# Patient Record
Sex: FEMALE | Race: WHITE
[De-identification: names, ages, dates, MRNs, and addresses within clinical notes are randomized per-mention and may not be internally consistent; named-entity substitution may affect disease eponyms.]

---

## 2018-01-10 ENCOUNTER — HOSPITAL ENCOUNTER (EMERGENCY)
Dept: HOSPITAL 59 - ER | Age: 63
Discharge: HOME | End: 2018-01-10
Payer: MEDICARE

## 2018-01-10 DIAGNOSIS — S16.1XXA: Primary | ICD-10-CM

## 2018-01-10 DIAGNOSIS — S20.211A: ICD-10-CM

## 2018-01-10 DIAGNOSIS — Z87.891: ICD-10-CM

## 2018-01-10 DIAGNOSIS — Z79.01: ICD-10-CM
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
## 2018-01-10 DIAGNOSIS — I10: ICD-10-CM

## 2018-01-10 DIAGNOSIS — S00.03XA: ICD-10-CM

## 2018-01-10 LAB
ANION GAP SERPL CALC-SCNC: 14 MMOL/L (ref 7–16)
APAP SERPL-MCNC: < 5 UG/ML (ref 10–30)
BASOPHILS NFR BLD: 0.5 % (ref 0–6)
BUN SERPL-MCNC: 6 MG/DL (ref 8–23)
CO2 SERPL-SCNC: 28 MMOL/L (ref 22–29)
CREAT SERPL-MCNC: 0.8 MG/DL (ref 0.5–0.9)
EOSINOPHIL NFR BLD: 3.5 % (ref 0–6)
ERYTHROCYTE [DISTWIDTH] IN BLOOD BY AUTOMATED COUNT: 14.2 % (ref 11.5–14.5)
EST GLOMERULAR FILTRATION RATE: > 60 ML/MIN
ETHANOL SERPL-MCNC: 0 G/DL (ref 0–0.01)
GLUCOSE SERPL-MCNC: 100 MG/DL (ref 74–109)
GRANULOCYTES NFR BLD: 55.5 % (ref 47–80)
HCT VFR BLD CALC: 39.5 % (ref 35–47)
HGB BLD-MCNC: 12.8 GM/DL (ref 11.6–16)
INR PPP: 4.23
LYMPHOCYTES NFR BLD AUTO: 29 % (ref 16–45)
MCH RBC QN AUTO: 29.1 PG (ref 27–33)
MCHC RBC AUTO-ENTMCNC: 32.4 G/DL (ref 32–36)
MCV RBC AUTO: 89.8 FL (ref 81–97)
MONOCYTES NFR BLD: 11.5 % (ref 0–9)
PLATELET # BLD: 384 K/UL (ref 130–400)
PMV BLD AUTO: 8.5 FL (ref 7.4–10.4)
PROTHROMBIN TIME: 46.3 SECONDS (ref 9.5–12.1)
RBC # BLD AUTO: 4.4 M/UL (ref 3.8–5.4)
WBC # BLD AUTO: 5.7 K/UL (ref 4.2–12.2)

## 2018-01-10 PROCEDURE — 80320 DRUG SCREEN QUANTALCOHOLS: CPT

## 2018-01-10 PROCEDURE — 99284 EMERGENCY DEPT VISIT MOD MDM: CPT

## 2018-01-10 PROCEDURE — 80048 BASIC METABOLIC PNL TOTAL CA: CPT

## 2018-01-10 PROCEDURE — 70450 CT HEAD/BRAIN W/O DYE: CPT

## 2018-01-10 PROCEDURE — 71046 X-RAY EXAM CHEST 2 VIEWS: CPT

## 2018-01-10 PROCEDURE — 85025 COMPLETE CBC W/AUTO DIFF WBC: CPT

## 2018-01-10 PROCEDURE — 99283 EMERGENCY DEPT VISIT LOW MDM: CPT

## 2018-01-10 PROCEDURE — 72125 CT NECK SPINE W/O DYE: CPT

## 2018-01-10 PROCEDURE — 80329 ANALGESICS NON-OPIOID 1 OR 2: CPT

## 2018-01-10 PROCEDURE — 85610 PROTHROMBIN TIME: CPT

## 2018-01-10 NOTE — EMERGENCY DEPARTMENT RECORD
History of Present Illness





- General


Chief complaint: Head Injury


Stated complaint: HEAD WOUND


Time Seen by Provider: 01/10/18 17:22





- Related Data


 Home Medications











 Medication  Instructions  Recorded  Confirmed  Last Taken


 


Alprazolam 1 mg PO ASDIR 01/10/18 01/10/18 Unknown


 


Atorvastatin Calcium 40 mg PO DAILY 01/10/18 01/10/18 Unknown


 


Bisacodyl [Laxative] 5 mg PO QHS 01/10/18 01/10/18 Unknown


 


Carvedilol [Coreg] 3.125 mg PO BID 01/10/18 01/10/18 Unknown


 


Citalopram Hydrobromide [Celexa] 40 mg PO DAILY 01/10/18 01/10/18 Unknown


 


Hydrocodone/Acetaminophen 1 tab PO Q6H PRN 01/10/18 01/10/18 Unknown





[Hydrocodone/Acetaminophen    





7.5mg/325mg]    


 


Lisinopril 2.5 mg PO DAILY 01/10/18 01/10/18 Unknown


 


Methyl Salicylate/Menthol 2 each TP QHS 01/10/18 01/10/18 Unknown





[Salonpas Patch]    


 


Metoclopramide HCl 10 mg PO BID 01/10/18 01/10/18 Unknown


 


Multivitamin [Multi-Vitamin Daily] 1 each PO DAILY 01/10/18 01/10/18 Unknown


 


Nitroglycerin 0.4 mg SL ASDIR 01/10/18 01/10/18 Unknown


 


Orphenadrine Citrate [Norflex] 100 mg PO BID 01/10/18 01/10/18 Unknown


 


Triamterene/Hydrochlorothiazid 1 each PO DAILY 01/10/18 01/10/18 Unknown





[Triamterene-Hctz 37.5-25 mg Cp]    


 


Vitamin E 400 unit PO DAILY 01/10/18 01/10/18 Unknown


 


Warfarin Sodium 3 mg PO DAILY 01/10/18 01/10/18 Unknown


 


Zolpidem Tartrate 10 mg PO ASDIR 01/10/18 01/10/18 Unknown











Allergies/Adverse reactions: 


 Allergies











Allergy/AdvReac Type Severity Reaction Status Date / Time


 


amoxicillin Allergy  PT UNSURE Verified 01/10/18 17:39





   OF REACTION  


 


aspirin Allergy  PT UNSURE Verified 01/10/18 17:39





   OF REACTION  


 


azithromycin Allergy  PT UNSURE Verified 01/10/18 17:39





   OF REACTION  


 


Beta-Blockers Allergy  PT UNSURE Verified 01/10/18 17:39





(Beta-Adrenergic Bloc   OF REACTION  


 


bismuth subsalicylate Allergy  PT UNSURE Verified 01/10/18 17:39





   OF REACTION  


 


carvedilol Allergy  PT UNSURE Verified 01/10/18 17:39





   OF REACTION  


 


celecoxib Allergy  PT UNSURE Verified 01/10/18 17:39





   OF REACTION  


 


cholestyramine Allergy  PT UNSURE Verified 01/10/18 17:39





   OF REACTION  


 


clopidogrel Allergy  PT UNSURE Verified 01/10/18 17:39





   OF REACTION  


 


cyclobenzaprine Allergy  PT UNSURE Verified 01/10/18 17:39





   OF REACTION  


 


doxycycline Allergy  PT UNSURE Verified 01/10/18 17:39





   OF REACTION  


 


eletriptan Allergy  PT UNSURE Verified 01/10/18 17:39





   OF REACTION  


 


fluoxetine Allergy  PT UNSURE Verified 01/10/18 17:39





   OF REACTION  


 


flurbiprofen Allergy  PT UNSURE Verified 01/10/18 17:39





   OF REACTION  


 


ibuprofen Allergy  PT UNSURE Verified 01/10/18 17:39





   OF REACTION  


 


isosorbide Allergy  PT UNSURE Verified 01/10/18 17:39





   OF REACTION  


 


levofloxacin Allergy  PT UNSURE Verified 01/10/18 17:39





   OF REACTION  


 


lisinopril Allergy  PT UNSURE Verified 01/10/18 17:39





   OF REACTION  


 


metaxalone Allergy  PT UNSURE Verified 01/10/18 17:39





   OF REACTION  


 


montelukast Allergy  PT UNSURE Verified 01/10/18 17:39





   OF REACTION  


 


polyethylene glycol 3350 Allergy  PT UNSURE Verified 01/10/18 17:39





   OF REACTION  


 


prednisone Allergy  PT UNSURE Verified 01/10/18 17:39





   OF REACTION  


 


propranolol Allergy  PT UNSURE Verified 01/10/18 17:39





   OF REACTION  


 


sertraline Allergy  PT UNSURE Verified 01/10/18 17:39





   OF REACTION  


 


sucrose Allergy  PT UNSURE Verified 01/10/18 17:39





   OF REACTION  


 


topiramate Allergy  PT UNSURE Verified 01/10/18 17:39





   OF REACTION  














Medical Decision Making





- Lab Data


Result diagrams: 


 01/10/18 17:30





 01/10/18 17:30





 Lab Results











  01/10/18 Range/Units





  17:30 


 


WBC  5.7  (4.2-12.2)  K/uL


 


RBC  4.40  (3.80-5.40)  M/uL


 


Hgb  12.8  (11.6-16.0)  gm/dl


 


Hct  39.5  (35.0-47.0)  %


 


MCV  89.8  (81-97)  fl


 


MCH  29.1  (27-33)  pg


 


MCHC  32.4  (32-36)  g/dl


 


RDW  14.2  (11.5-14.5)  %


 


Plt Count  384  (130-400)  K/uL


 


MPV  8.5  (7.4-10.4)  fl


 


Gran %  55.5  (47-80)  %


 


Lymphocytes %  29.0  (16-45)  %


 


Monocytes %  11.5 H  (0-9)  %


 


Eosinophils %  3.5  (0-6)  %


 


Basophils %  0.5  (0-6)  %














Disposition


Clinical Impression: 


 Contusion of head, Cervical strain, Contusion, chest wall





Disposition: Home, Self-Care


Condition: (1) Good


Instructions:  Concussion (ED), Contusion in Adults (ED)


Additional Instructions: 


stop coumadin till monday


No aspirin


follow up with family  in one week


any symptoms return to ED





 


Forms:  Patient Portal Access





Quality





- Quality Measures


Quality Measures: N/A





- Blood Pressure Screening


Does Patient Have Any of the Following: No


Blood Pressure Classification: Pre-Hypertensive BP Reading


Systolic Measurement: 127


Diastolic Measurement: 72


Screening for High Blood Pressure: < Pre-Hypertensive BP, F/U Documented > [

]


Pre-Hypertensive Follow-up Interventions: Referral to alternative/primary care 

provider.

## 2018-01-10 NOTE — EMERGENCY DEPARTMENT RECORD
History of Present Illness





- General


Chief complaint: Head Injury


Stated complaint: HEAD WOUND


Time Seen by Provider: 01/10/18 17:22


Source: Patient





- History of Present Illness


Initial comments: 


patient slipped on a dog toy and hit the back of head and posterior right 

chest. alert NO LOC , no nausea or vomiting. ambulating without problems.








- Related Data


 Home Medications











 Medication  Instructions  Recorded  Confirmed  Last Taken


 


Alprazolam 1 mg PO ASDIR 01/10/18 01/10/18 Unknown


 


Atorvastatin Calcium 40 mg PO DAILY 01/10/18 01/10/18 Unknown


 


Bisacodyl [Laxative] 5 mg PO QHS 01/10/18 01/10/18 Unknown


 


Carvedilol [Coreg] 3.125 mg PO BID 01/10/18 01/10/18 Unknown


 


Citalopram Hydrobromide [Celexa] 40 mg PO DAILY 01/10/18 01/10/18 Unknown


 


Hydrocodone/Acetaminophen 1 tab PO Q6H PRN 01/10/18 01/10/18 Unknown





[Hydrocodone/Acetaminophen    





7.5mg/325mg]    


 


Lisinopril 2.5 mg PO DAILY 01/10/18 01/10/18 Unknown


 


Methyl Salicylate/Menthol 2 each TP QHS 01/10/18 01/10/18 Unknown





[Salonpas Patch]    


 


Metoclopramide HCl 10 mg PO BID 01/10/18 01/10/18 Unknown


 


Multivitamin [Multi-Vitamin Daily] 1 each PO DAILY 01/10/18 01/10/18 Unknown


 


Nitroglycerin 0.4 mg SL ASDIR 01/10/18 01/10/18 Unknown


 


Orphenadrine Citrate [Norflex] 100 mg PO BID 01/10/18 01/10/18 Unknown


 


Triamterene/Hydrochlorothiazid 1 each PO DAILY 01/10/18 01/10/18 Unknown





[Triamterene-Hctz 37.5-25 mg Cp]    


 


Vitamin E 400 unit PO DAILY 01/10/18 01/10/18 Unknown


 


Warfarin Sodium 3 mg PO DAILY 01/10/18 01/10/18 Unknown


 


Zolpidem Tartrate 10 mg PO ASDIR 01/10/18 01/10/18 Unknown











Allergies/Adverse reactions: 


 Allergies











Allergy/AdvReac Type Severity Reaction Status Date / Time


 


amoxicillin Allergy  PT UNSURE Verified 01/10/18 17:39





   OF REACTION  


 


aspirin Allergy  PT UNSURE Verified 01/10/18 17:39





   OF REACTION  


 


azithromycin Allergy  PT UNSURE Verified 01/10/18 17:39





   OF REACTION  


 


Beta-Blockers Allergy  PT UNSURE Verified 01/10/18 17:39





(Beta-Adrenergic Bloc   OF REACTION  


 


bismuth subsalicylate Allergy  PT UNSURE Verified 01/10/18 17:39





   OF REACTION  


 


carvedilol Allergy  PT UNSURE Verified 01/10/18 17:39





   OF REACTION  


 


celecoxib Allergy  PT UNSURE Verified 01/10/18 17:39





   OF REACTION  


 


cholestyramine Allergy  PT UNSURE Verified 01/10/18 17:39





   OF REACTION  


 


clopidogrel Allergy  PT UNSURE Verified 01/10/18 17:39





   OF REACTION  


 


cyclobenzaprine Allergy  PT UNSURE Verified 01/10/18 17:39





   OF REACTION  


 


doxycycline Allergy  PT UNSURE Verified 01/10/18 17:39





   OF REACTION  


 


eletriptan Allergy  PT UNSURE Verified 01/10/18 17:39





   OF REACTION  


 


fluoxetine Allergy  PT UNSURE Verified 01/10/18 17:39





   OF REACTION  


 


flurbiprofen Allergy  PT UNSURE Verified 01/10/18 17:39





   OF REACTION  


 


ibuprofen Allergy  PT UNSURE Verified 01/10/18 17:39





   OF REACTION  


 


isosorbide Allergy  PT UNSURE Verified 01/10/18 17:39





   OF REACTION  


 


levofloxacin Allergy  PT UNSURE Verified 01/10/18 17:39





   OF REACTION  


 


lisinopril Allergy  PT UNSURE Verified 01/10/18 17:39





   OF REACTION  


 


metaxalone Allergy  PT UNSURE Verified 01/10/18 17:39





   OF REACTION  


 


montelukast Allergy  PT UNSURE Verified 01/10/18 17:39





   OF REACTION  


 


polyethylene glycol 3350 Allergy  PT UNSURE Verified 01/10/18 17:39





   OF REACTION  


 


prednisone Allergy  PT UNSURE Verified 01/10/18 17:39





   OF REACTION  


 


propranolol Allergy  PT UNSURE Verified 01/10/18 17:39





   OF REACTION  


 


sertraline Allergy  PT UNSURE Verified 01/10/18 17:39





   OF REACTION  


 


sucrose Allergy  PT UNSURE Verified 01/10/18 17:39





   OF REACTION  


 


topiramate Allergy  PT UNSURE Verified 01/10/18 17:39





   OF REACTION  














Review of Systems


Reviewed: No additional complaints except as noted below


Constitutional: Reports: As per HPI.  Denies: Chills, Fever, Malaise, Night 

sweats, Weakness, Weight change


Eyes: Reports: As per HPI.  Denies: Eye discharge, Eye pain, Photophobia, 

Vision change


ENT: Reports: As per HPI.  Denies: Congestion, Dental pain, Ear pain, Epistaxis

, Hearing loss, Throat pain


Respiratory: Reports: As per HPI.  Denies: Cough, Dyspnea, Hemoptysis, Stridor, 

Wheezes


Cardiovascular: Reports: As per HPI.  Denies: Arrhythmia, Chest pain, Dyspnea 

on exertion, Edema, Murmurs, Orthopnea, Palpitations, Paroxysmal nocturnal 

dyspnea, Rheumatic Fever, Syncope


Endocrine: Reports: As per HPI.  Denies: Fatigue, Heat or cold intolerance, 

Polydipsia, Polyuria


Gastrointestinal: Reports: As per HPI.  Denies: Abdominal pain, Constipation, 

Diarrhea, Hematemesis, Hematochezia, Melena, Nausea, Vomiting


Genitourinary: Reports: As per HPI.  Denies: Abnormal menses, Discharge, 

Dyspareunia, Dysuria, Frequency, Hematuria, Incontinence, Retention, Urgency


Musculoskeletal: Reports: As per HPI.  Denies: Arthralgia, Back pain, Gout, 

Joint swelling, Myalgia, Neck pain


Skin: Reports: As per HPI.  Denies: Bruising, Change in color, Change in hair/

nails, Lesions, Pruritus, Rash


Neurological: Reports: As per HPI.  Denies: Abnormal gait, Confusion, Headache, 

Numbness, Paresthesias, Seizure, Tingling, Tremors, Vertigo, Weakness


Psychiatric: Reports: As per HPI.  Denies: Anxiety, Auditory hallucinations, 

Depression, Homicidal thoughts, Suicidal thoughts, Visual hallucinations


Hematological/Lymphatic: Reports: As per HPI.  Denies: Anemia, Blood Clots, 

Easy bleeding, Easy bruising, Swollen glands





Physical Exam





- General


General Appearance: Alert, Oriented x3, Cooperative, No acute distress





- Head


Head exam: Normal inspection





- Eye


Eye exam: Normal appearance, PERRL


Pupils: Normal accommodation





- ENT


ENT exam: Normal exam, Mucous membranes moist, Normal external ear exam, Normal 

orophraynx, TM's normal bilaterally


Ear exam: Normal external inspection.  negative: External canal tenderness


Nasal Exam: Normal inspection.  negative: Discharge, Sinus tenderness


Mouth exam: Normal external inspection, Tongue normal


Teeth exam: Normal inspection.  negative: Dental caries


Throat exam: Normal inspection.  negative: Tonsillar erythema, Tonsillar exudate





- Neck


Neck exam: Normal inspection, Full ROM.  negative: Tenderness





- Respiratory


Respiratory exam: Normal lung sounds bilaterally.  negative: Respiratory 

distress





- Cardiovascular


Cardiovascular Exam: Regular rate, Normal rhythm, Normal heart sounds





- GI/Abdominal


GI/Abdominal exam: Soft, Normal bowel sounds.  negative: Tenderness





- Rectal


Rectal exam: Deferred





- 


 exam: Deferred





- Extremities


Extremities exam: Normal inspection, Full ROM, Normal capillary refill.  

negative: Tenderness





- Back


Back exam: Reports: Normal inspection, Full ROM.  Denies: Muscle spasm, Rash 

noted, Tenderness





- Neurological


Neurological exam: Alert, Normal gait, Oriented X3, Reflexes normal





- Psychiatric


Psychiatric exam: Normal affect, Normal mood





- Skin


Skin exam: Dry, Intact, Normal color, Warm





Medical Decision Making





- Data Complexity


MDM Data: Labs Ordered and/or Reviewed (INR 4.5, K 3.1), X-Ray Ordered and/or 

Reviewed (CT head and neck negative for fractures)





- Lab Data


Result diagrams: 


 01/10/18 17:30





 01/10/18 17:30





 Lab Results











  01/10/18 Range/Units





  17:30 


 


WBC  5.7  (4.2-12.2)  K/uL


 


RBC  4.40  (3.80-5.40)  M/uL


 


Hgb  12.8  (11.6-16.0)  gm/dl


 


Hct  39.5  (35.0-47.0)  %


 


MCV  89.8  (81-97)  fl


 


MCH  29.1  (27-33)  pg


 


MCHC  32.4  (32-36)  g/dl


 


RDW  14.2  (11.5-14.5)  %


 


Plt Count  384  (130-400)  K/uL


 


MPV  8.5  (7.4-10.4)  fl


 


Gran %  55.5  (47-80)  %


 


Lymphocytes %  29.0  (16-45)  %


 


Monocytes %  11.5 H  (0-9)  %


 


Eosinophils %  3.5  (0-6)  %


 


Basophils %  0.5  (0-6)  %














Disposition


Clinical Impression: 


Contusion of head


Qualifiers:


 Encounter type: initial encounter Contusion of head detail: scalp Qualified 

Code(s): S00.03XA - Contusion of scalp, initial encounter





Cervical strain


Qualifiers:


 Encounter type: initial encounter Qualified Code(s): S16.1XXA - Strain of 

muscle, fascia and tendon at neck level, initial encounter





Contusion, chest wall


Qualifiers:


 Encounter type: initial encounter Laterality: right Qualified Code(s): 

S20.211A - Contusion of right front wall of thorax, initial encounter





Disposition: Home, Self-Care


Condition: (1) Good


Instructions:  Concussion (ED), Contusion in Adults (ED)


Additional Instructions: 


stop coumadin till monday


No aspirin


follow up with family  in one week


any symptoms return to ED





 


Forms:  Patient Portal Access


Time of Disposition: 18:08





Quality





- Quality Measures


Quality Measures: N/A





- Blood Pressure Screening


Does Patient Have Any of the Following: No


Blood Pressure Classification: Pre-Hypertensive BP Reading


Systolic Measurement: 127


Diastolic Measurement: 72


Screening for High Blood Pressure: < Pre-Hypertensive BP, F/U Documented > [

]


Pre-Hypertensive Follow-up Interventions: Referral to alternative/primary care 

provider.

## 2018-01-11 NOTE — CT SCAN REPORT
EXAM:  CT SCAN HEAD WO CONTRAST 



HISTORY:  FALL. 



TECHNIQUE:  CT brain without contrast. 



COMPARISON: None. 



FINDINGS:  Globes are intact. Paranasal sinuses and mastoid air cells are 
unremarkable. No displaced or depressed skull fracture. No intra- or extra-
axial hemorrhage. CT limited for evaluation of acute infarct. No CT evidence 
for large or territorial acute infarct. No mass or midline shift. Mild diffuse 
atrophy. Hypodensities in the periventricular and subcortical white matter 
likely sequelae of small vessel ischemic change. 



IMPRESSION: 



NEGATIVE FOR ACUTE INTRACRANIAL ABNORMALITY. MILD ATROPHY. SMALL VESSEL 
ISCHEMIC CHANGE. 



JOB NUMBER: [id number]
[facility]

## 2018-01-11 NOTE — CT SCAN REPORT
EXAM:  CT SCAN CERVICAL SPINE WO CONTRAST 



HISTORY:  FALL. 



TECHNIQUE:  Axial CT images of the cervical spine with coronal and sagittal 
reconstructions.



COMPARISON: None.  



FINDINGS:  Evaluation of spinal canal contents is limited due to CT technique. 
However, vertebral body height and alignment are preserved. The atlantoaxial 
space is preserved. The lateral masses are not displaced. Degenerative change 
at multiple levels. Equivocal anterolisthesis of C3 on C4 likely developmental 
or degenerative in nature. Surrounding soft tissues are unremarkable. 



IMPRESSION:  NO CT EVIDENCE FOR ACUTE C-SPINE ABNORMALITY. MULTILEVEL 
DEGENERATIVE CHANGE. 



JOB NUMBER: [id number]
[facility]

## 2018-01-11 NOTE — RADIOLOGY REPORT
EXAM:  CHEST 2 VIEWS



HISTORY:  CHEST PAIN. 



TECHNIQUE:  Frontal and lateral views of the chest. 



COMPARISON: None. 



FINDINGS:  Heart size at the upper limits of normal. Atheromatous change, 
thoracic aorta. Osteopenia. Left-sided pacing device. Lungs are clear. No 
pneumothorax. 



IMPRESSION:  NO ACUTE CARDIOPULMONARY PROCESS. 



JOB NUMBER: [id number]
[facility]